# Patient Record
(demographics unavailable — no encounter records)

---

## 2024-12-24 NOTE — DISCUSSION/SUMMARY
[Obstructive Sleep Apnea] : obstructive sleep apnea [Severe] : severe [Improving] : improving [Responding to Treatment] : responding to treatment [None] : There are no changes in medication management [Alcohol Avoidance] : alcohol avoidance [Sedative Avoidance] : sedative avoidance [Intra-Oral Device] : intra-oral device [Nasal Strips] : nasal strips [de-identified] : Patient compliant with CPAP/BiPAP and benefiting from therapy

## 2024-12-24 NOTE — HISTORY OF PRESENT ILLNESS
[Obstructive Sleep Apnea] : obstructive sleep apnea [Home] : home [APAP:] : APAP [Nasal mask] : nasal mask [Awakes Unrefreshed] : does not awaken unrefreshed [Awakes with Dry Mouth] : does not awaken with dry mouth [Awakes with Headache] : does not awaken with headache [Daytime Somnolence] : denies daytime somnolence [Snoring] : no snoring [Witnessed Apneas] : no witnessed apneas [TextBox_77] : 6136 [TextBox_79] : 8480 [TextBox_81] : 5 [TextBox_89] : 0 [TextBox_93] : easy to fall back asleep [TextBox_100] : 7/6/2023 [TextBox_108] : 88.3 [TextBox_112] : 89 minutes [TextBox_116] : 60 [TextBox_120] :  minutes [TextBox_125] : 5-20 [TextBox_127] : 11/23/2024 [TextBox_129] : 12/22/2024 [TextBox_133] : 100 [TextBox_137] : 100 [TextBox_141] : 7 [TextBox_143] : 30 [TextBox_147] : 2.9 [TextBox_158] : [facility] [TextBox_160] : N20 (medium) [TextBox_162] : 11/6/2023 [TextBox_165] : Pavg-11.4cm H2O [ESS] : 0

## 2024-12-24 NOTE — END OF VISIT
[Time Spent: ___ minutes] : I have spent [unfilled] minutes of time on the encounter which excludes teaching and separately reported services. [FreeTextEntry3] : Visit initiated at the request of the patient or caregiver. I discussed with patient  the limitations of telemedicine encounters, including risks associated with the technology platform, technical difficulties, data security, and a limited physical exam. There is also a limitation in performing diagnostic procedures and patient may need further testing and workup to arrive at a diagnosis or treatment plan. We discussed this will be billed as a visit.  Over 50% of the visit was spent on counseling and coordination of care.

## 2024-12-24 NOTE — CONSULT LETTER
[Dear  ___] : Dear  [unfilled], [Consult Letter:] : I had the pleasure of evaluating your patient, [unfilled]. [Please see my note below.] : Please see my note below. [Consult Closing:] : Thank you very much for allowing me to participate in the care of this patient.  If you have any questions, please do not hesitate to contact me. [Sincerely,] : Sincerely, [FreeTextEntry3] : Randolph Gordon MD FCCP Pulmonary/Critical Care/Sleep Medicine Department of Internal Medicine  [facility]